# Patient Record
Sex: MALE | Race: WHITE
[De-identification: names, ages, dates, MRNs, and addresses within clinical notes are randomized per-mention and may not be internally consistent; named-entity substitution may affect disease eponyms.]

---

## 2020-09-26 ENCOUNTER — HOSPITAL ENCOUNTER (EMERGENCY)
Dept: HOSPITAL 62 - ER | Age: 30
Discharge: HOME | End: 2020-09-26
Payer: COMMERCIAL

## 2020-09-26 VITALS — DIASTOLIC BLOOD PRESSURE: 84 MMHG | SYSTOLIC BLOOD PRESSURE: 116 MMHG
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
## 2020-09-26 DIAGNOSIS — F12.10: ICD-10-CM

## 2020-09-26 DIAGNOSIS — F17.210: ICD-10-CM
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
## 2020-09-26 DIAGNOSIS — M24.412: Primary | ICD-10-CM

## 2020-09-26 PROCEDURE — 99284 EMERGENCY DEPT VISIT MOD MDM: CPT

## 2020-09-26 PROCEDURE — 96374 THER/PROPH/DIAG INJ IV PUSH: CPT

## 2020-09-26 PROCEDURE — 73030 X-RAY EXAM OF SHOULDER: CPT

## 2020-09-26 PROCEDURE — 73020 X-RAY EXAM OF SHOULDER: CPT

## 2020-09-26 PROCEDURE — 99152 MOD SED SAME PHYS/QHP 5/>YRS: CPT

## 2020-09-26 PROCEDURE — 23650 CLTX SHO DSLC W/MNPJ WO ANES: CPT

## 2020-09-26 NOTE — ER DOCUMENT REPORT
ED Extremity Problem, Upper





- General


Chief Complaint: Shoulder Injury


Stated Complaint: LEFT SHOULDER INJURY


Time Seen by Provider: 09/26/20 14:37


Primary Care Provider: 


SACHI FROST MD [ACTIVE STAFF] - Follow up as needed


Mode of Arrival: Ambulatory


Information source: Patient


Notes: 





ED Medical Screen ([facility] notes)





- General


Chief Complaint: Shoulder Injury


Stated Complaint: LEFT SHOULDER INJURY


Time Seen by Provider: 09/26/20 14:37


Notes: 





HPI: 30-year-old right-hand-dominant male left shoulder injury fell with arm 

outstretched while skating.  States he believes he has dislocated both shoulders

previously but they usually pop back in but did not this time





PHYSICAL EXAMINATION: Clinically appears to have an anterior dislocation with 

deficit over the left shoulder girdle





MY NOTES


30-year-old  male who [facility] [place] [facility] where he was using a skateboard and fell dislocating his left shoulder.  

Patient reports he is done this multiple times at least 50 times in the past and

he self reduced it.  Today the shoulder did not reduce.  His mother Tyra 

reports he has been stating since he was 5 years old.  patient works as 

 [place] [employer] he will need a note for work for at least 1 week to 

follow-up with orthopedics and have a left shoulder immobilizer.  This occurred 

approximately 1300.  Patient denied any LOC.  He has full pulses good capillary 

refill and good sensation to the fingers wrist elbow but obvious deformity to 

the left shoulder.  X-ray reveals anterior dislocation.  No obvious fractures.


TRAVEL OUTSIDE OF THE [place] IN LAST 30 DAYS: No





- HPI


Patient complains to provider of: Injury, Swelling, Left, Shoulder


Onset: Just prior to arrival


Recent injury: No


Where: Outdoors





- Related Data


Allergies/Adverse Reactions: 


                                        





No Known Allergies Allergy (Verified 09/26/20 16:12)


   











Past Medical History





- General


Information source: Patient, Parent - mother at bedside





- Social History


Smoking Status: Current Every Day Smoker - Smoker 1 pack/day since he was 17


Cigarette use (# per day): Yes


Chew tobacco use (# tins/day): No


Smoking Education Provided: Yes


Frequency of alcohol use: Occasional - Beer drinker he took a Tylenol this 

morning because of headache after drinking beer last night


Drug Abuse: Marijuana


Lives with: Family


Family History: Reviewed & Not Pertinent


Patient has suicidal ideation: No


Patient has homicidal ideation: No





Review of Systems





- Review of Systems


Constitutional: No symptoms reported


EENT: No symptoms reported


Cardiovascular: No symptoms reported


Respiratory: No symptoms reported


Gastrointestinal: No symptoms reported


Genitourinary: No symptoms reported


Male Genitourinary: No symptoms reported


Musculoskeletal: See HPI, Joint pain - Left shoulder full pulses good capillary 

refill as per HPI., Joint swelling - Left shoulder dislocation, Muscle pain


Skin: No symptoms reported


Hematologic/Lymphatic: No symptoms reported


Neurological/Psychological: No symptoms reported





Physical Exam





- Vital signs


Vitals: 


                                        











Temp Pulse Resp BP Pulse Ox


 


 98.1 F   83   20   123/79   98 


 


 09/26/20 14:43  09/26/20 14:43  09/26/20 14:43  09/26/20 14:43  09/26/20 14:43











Interpretation: Normal





- General


General appearance: Appears well, Alert





- HEENT


Head: Normocephalic, Atraumatic


Eyes: Normal


Pupils: PERRL





- Respiratory


Respiratory status: No respiratory distress


Chest status: Nontender


Breath sounds: Normal


Chest palpation: Normal





- Cardiovascular


Rhythm: Regular


Heart sounds: Normal auscultation


Murmur: No





- Abdominal


Inspection: Normal


Distension: No distension


Bowel sounds: Normal


Tenderness: Nontender


Organomegaly: No organomegaly





- Rectal


Prostate: Other - deferred





- Genitourinary


Scrotum: Other - deferred





- Back


Back: Normal, Nontender





- Extremities


General upper extremity: Tender - Limited range of motion left shoulder because 

of dislocation anteriorly full pulses good cap refill full range of motion wrist

fingers elbow and also full range of motion of right upper extremity with good 

sensation pulses, Normal color, Normal temperature


General lower extremity: Normal inspection, Nontender, Normal color, Normal ROM,

Normal temperature, Normal weight bearing.  No: Yara's sign





- Neurological


Neuro grossly intact: Yes


Cognition: Normal


Orientation: AAOx4


[place] Coma Scale Eye Opening: Spontaneous


[place] Coma Scale Verbal: Oriented


[place] Coma Scale Motor: Obeys Commands


[place] Coma Scale Total: 15


Speech: Normal


Motor strength normal: LUE, RUE, LLE, RLE


Sensory: Normal





- Psychological


Associated symptoms: Normal affect, Normal mood





- Skin


Skin Temperature: Warm


Skin Moisture: Dry


Skin Color: Normal





Course





- Vital Signs


Vital signs: 


                                        











Temp Pulse Resp BP Pulse Ox


 


 98.1 F   66   19   116/84   97 


 


 09/26/20 14:43  09/26/20 16:10  09/26/20 16:56  09/26/20 16:56  09/26/20 16:56














Procedures





- Joint Reduction/Fracture Care


  ** Left Shoulder


Time completed: 16:07


Consent obtained: Yes


Conscious sedation: Yes


Pre-procedure NV exam: Yes


Fracture: Other - no obvious fx


Post-procedure NV exam: Yes


Post-reduction x-ray: Joint reduced


Complications: No





Critical Care Note





- Critical Care Note


Comments: 





Repeat shoulder reveals reduced shoulder on left around 1600.





Discharge





- Discharge


Clinical Impression: 


 shoulder reduction left





Dislocation of left shoulder joint


Qualifiers:


 Encounter type: initial encounter Qualified Code(s): S43.005A - Unspecified 

dislocation of left shoulder joint, initial encounter





Condition: Good


Disposition: HOME, SELF-CARE


Additional Instructions: 


Follow-up with Dr. Cecil Frost orthopedics; R ICE that is rest ice compression 

elevation and do not use your left shoulder until cleared by orthopedics.  Off 

work as directed.  May take ibuprofen with a meal or ice cream or milk.


Forms:  Return to Work


Referrals: 


SACHI FROST MD [ACTIVE STAFF] - Follow up as needed

## 2020-09-26 NOTE — ER DOCUMENT REPORT
ED Medical Screen (RME)





- General


Chief Complaint: Shoulder Injury


Stated Complaint: LEFT SHOULDER INJURY


Time Seen by Provider: 09/26/20 14:37


Notes: 





HPI: 30-year-old right-hand-dominant male left shoulder injury fell with arm 

outstretched while skating.  States he believes he has dislocated both shoulders

previously but they usually pop back in but did not this time





PHYSICAL EXAMINATION: Clinically appears to have an anterior dislocation with 

deficit over the left shoulder girdle











I have greeted and performed a rapid initial assessment of this patient.  A 

comprehensive ED assessment and evaluation of the patient, analysis of test r

esults and completion of medical decision making process will be conducted by an

additional ED providers.

## 2020-09-26 NOTE — RADIOLOGY REPORT (SQ)
EXAM DESCRIPTION:  SHOULDER LEFT 2 OR MORE VIEWS



IMAGES COMPLETED DATE/TIME:  9/26/2020 3:01 pm



REASON FOR STUDY:  dislocation



COMPARISON:  None.



NUMBER OF VIEWS:  Two views.



TECHNIQUE:  Frontal and lateral images acquired of the left shoulder.



LIMITATIONS:  None.



FINDINGS:  MINERALIZATION: Normal.

BONES: No acute fracture.  No worrisome bone lesions.

JOINTS: Anterior dislocation of the humeral head.

VISUALIZED LUNGS AND RIBS: No pneumothorax.  No rib fracture.

SOFT TISSUES: No radiopaque foreign body.

OTHER: No other significant finding.



IMPRESSION:  ANTERIOR DISLOCATION OF THE HUMERAL HEAD.  NO FRACTURE.



TECHNICAL DOCUMENTATION:  JOB ID:  [id number]

 2011 [employer]- All Rights Reserved



Reading location - IP/workstation name: [email or web address]

## 2020-09-26 NOTE — RADIOLOGY REPORT (SQ)
EXAM DESCRIPTION:  SHOULDER LEFT 1 VIEW



IMAGES COMPLETED DATE/TIME:  9/26/2020 4:05 pm



REASON FOR STUDY:  reduction left shoulder



COMPARISON:  None.



NUMBER OF VIEWS:  One view.



TECHNIQUE:  AP image acquired of the left shoulder.



LIMITATIONS:  None.



FINDINGS:  There has been closed reduction of the humeral head dislocation.  Positioning appears to b
e appropriate.  No fracture visualized.



IMPRESSION:  APPROPRIATE POSITION FOLLOWING CLOSED REDUCTION.



TECHNICAL DOCUMENTATION:  JOB ID:  [id number]

 2011 [employer]- All Rights Reserved



Reading location - IP/workstation name: [email or web address]